# Patient Record
Sex: MALE | Race: OTHER | HISPANIC OR LATINO | URBAN - METROPOLITAN AREA
[De-identification: names, ages, dates, MRNs, and addresses within clinical notes are randomized per-mention and may not be internally consistent; named-entity substitution may affect disease eponyms.]

---

## 2014-07-03 RX ORDER — INSULIN GLARGINE 100 [IU]/ML
38 INJECTION, SOLUTION SUBCUTANEOUS
Qty: 0 | Refills: 0 | DISCHARGE
Start: 2014-07-03

## 2018-07-12 ENCOUNTER — EMERGENCY (EMERGENCY)
Facility: HOSPITAL | Age: 23
LOS: 1 days | Discharge: HOME | End: 2018-07-12
Attending: EMERGENCY MEDICINE | Admitting: EMERGENCY MEDICINE
Payer: COMMERCIAL

## 2018-07-12 VITALS
TEMPERATURE: 98 F | HEART RATE: 83 BPM | DIASTOLIC BLOOD PRESSURE: 90 MMHG | OXYGEN SATURATION: 95 % | SYSTOLIC BLOOD PRESSURE: 134 MMHG | RESPIRATION RATE: 18 BRPM | WEIGHT: 147.93 LBS | HEIGHT: 67 IN

## 2018-07-12 DIAGNOSIS — Z76.0 ENCOUNTER FOR ISSUE OF REPEAT PRESCRIPTION: ICD-10-CM

## 2018-07-12 DIAGNOSIS — T85.694A OTHER MECHANICAL COMPLICATION OF INSULIN PUMP, INITIAL ENCOUNTER: ICD-10-CM

## 2018-07-12 DIAGNOSIS — E10.9 TYPE 1 DIABETES MELLITUS WITHOUT COMPLICATIONS: ICD-10-CM

## 2018-07-12 PROCEDURE — 82962 GLUCOSE BLOOD TEST: CPT

## 2018-07-12 PROCEDURE — 99283 EMERGENCY DEPT VISIT LOW MDM: CPT | Mod: 25

## 2018-07-12 PROCEDURE — 96372 THER/PROPH/DIAG INJ SC/IM: CPT

## 2018-07-12 PROCEDURE — 99283 EMERGENCY DEPT VISIT LOW MDM: CPT

## 2018-07-12 RX ORDER — INSULIN GLARGINE 100 [IU]/ML
30 INJECTION, SOLUTION SUBCUTANEOUS ONCE
Refills: 0 | Status: COMPLETED | OUTPATIENT
Start: 2018-07-12 | End: 2018-07-12

## 2018-07-12 RX ADMIN — INSULIN GLARGINE 30 UNIT(S): 100 INJECTION, SOLUTION SUBCUTANEOUS at 16:22

## 2018-07-12 NOTE — ED PROVIDER NOTE - MEDICAL DECISION MAKING DETAILS
JACKLYN onrelas, comfortable w/out medical complaints, d/w Dr. Hahn at [phone], pt's endocrinologist, who is sending meds to his pharmacy, recommending 1x dose 30u Lantus SQ in the ED. Will administer and discharge pt.

## 2018-07-12 NOTE — ED PROVIDER NOTE - PHYSICAL EXAMINATION
VITAL SIGNS: I have reviewed nursing notes and confirm.  CONSTITUTIONAL: Well-developed; well-nourished; in no acute distress.  SKIN: Agree with RN documentation regarding decubitus evaluation. Remainder of skin exam is warm and dry, no acute rash.  HEAD: Normocephalic; atraumatic.  EYES: PERRL, EOM intact; conjunctiva and sclera clear.  ENT: No nasal discharge; airway clear.  NECK: Supple; non tender.  CARD: S1, S2 normal; no murmurs, gallops, or rubs. RRR  RESP: No wheezes, rales or rhonchi.  ABD: Normal bowel sounds; soft; non-distended; non-tender  EXT: Normal ROM. No clubbing, cyanosis or edema.  LYMPH: No acute cervical adenopathy.  NEURO: Alert, oriented. Grossly unremarkable.  PSYCH: Cooperative, appropriate.

## 2018-07-12 NOTE — ED ADULT TRIAGE NOTE - CHIEF COMPLAINT QUOTE
Pt requesting Rx refill.  Pt states "I've been having issues with my insurance and my doctor but I'm now on my last insulin pump and only have couple pens left."  FSBG WNL.

## 2018-07-12 NOTE — ED PROVIDER NOTE - OBJECTIVE STATEMENT
21 y/o M w/hx IDDM, has run out of omnipod insulin pump pods to administer his insulin and is having difficulty securing insurance authorization to receive pods. Presented to ED for assistance in obtaining his medication. No medical complaints at this time, measured blood glucose in mid 100s throughout the day today.

## 2020-01-19 ENCOUNTER — INPATIENT (INPATIENT)
Facility: HOSPITAL | Age: 25
LOS: 1 days | Discharge: HOME | DRG: 638 | End: 2020-01-21
Attending: FAMILY MEDICINE | Admitting: FAMILY MEDICINE
Payer: COMMERCIAL

## 2020-01-19 ENCOUNTER — TRANSCRIPTION ENCOUNTER (OUTPATIENT)
Age: 25
End: 2020-01-19

## 2020-01-19 VITALS
SYSTOLIC BLOOD PRESSURE: 140 MMHG | HEART RATE: 106 BPM | HEIGHT: 67 IN | OXYGEN SATURATION: 97 % | DIASTOLIC BLOOD PRESSURE: 96 MMHG | TEMPERATURE: 98 F | RESPIRATION RATE: 18 BRPM | WEIGHT: 167.99 LBS

## 2020-01-19 DIAGNOSIS — R73.9 HYPERGLYCEMIA, UNSPECIFIED: ICD-10-CM

## 2020-01-19 DIAGNOSIS — E11.65 TYPE 2 DIABETES MELLITUS WITH HYPERGLYCEMIA: ICD-10-CM

## 2020-01-19 DIAGNOSIS — Z29.9 ENCOUNTER FOR PROPHYLACTIC MEASURES, UNSPECIFIED: ICD-10-CM

## 2020-01-19 LAB
ALBUMIN SERPL ELPH-MCNC: 3.8 G/DL — SIGNIFICANT CHANGE UP (ref 3.5–5)
ALP SERPL-CCNC: 179 U/L — HIGH (ref 40–120)
ALT FLD-CCNC: 54 U/L DA — SIGNIFICANT CHANGE UP (ref 10–60)
ANION GAP SERPL CALC-SCNC: 10 MMOL/L — SIGNIFICANT CHANGE UP (ref 5–17)
ANION GAP SERPL CALC-SCNC: 2 MMOL/L — LOW (ref 5–17)
ANION GAP SERPL CALC-SCNC: 9 MMOL/L — SIGNIFICANT CHANGE UP (ref 5–17)
APPEARANCE UR: CLEAR — SIGNIFICANT CHANGE UP
AST SERPL-CCNC: 41 U/L — HIGH (ref 10–40)
BASE EXCESS BLDV CALC-SCNC: -1 MMOL/L — SIGNIFICANT CHANGE UP (ref -2–2)
BASOPHILS # BLD AUTO: 0.06 K/UL — SIGNIFICANT CHANGE UP (ref 0–0.2)
BASOPHILS NFR BLD AUTO: 0.7 % — SIGNIFICANT CHANGE UP (ref 0–2)
BILIRUB SERPL-MCNC: 0.8 MG/DL — SIGNIFICANT CHANGE UP (ref 0.2–1.2)
BILIRUB UR-MCNC: NEGATIVE — SIGNIFICANT CHANGE UP
BUN SERPL-MCNC: 16 MG/DL — SIGNIFICANT CHANGE UP (ref 7–18)
BUN SERPL-MCNC: 18 MG/DL — SIGNIFICANT CHANGE UP (ref 7–18)
BUN SERPL-MCNC: 9 MG/DL — SIGNIFICANT CHANGE UP (ref 7–18)
CALCIUM SERPL-MCNC: 7.9 MG/DL — LOW (ref 8.4–10.5)
CALCIUM SERPL-MCNC: 8.2 MG/DL — LOW (ref 8.4–10.5)
CALCIUM SERPL-MCNC: 9.6 MG/DL — SIGNIFICANT CHANGE UP (ref 8.4–10.5)
CHLORIDE SERPL-SCNC: 104 MMOL/L — SIGNIFICANT CHANGE UP (ref 96–108)
CHLORIDE SERPL-SCNC: 107 MMOL/L — SIGNIFICANT CHANGE UP (ref 96–108)
CHLORIDE SERPL-SCNC: 96 MMOL/L — SIGNIFICANT CHANGE UP (ref 96–108)
CO2 SERPL-SCNC: 19 MMOL/L — LOW (ref 22–31)
CO2 SERPL-SCNC: 25 MMOL/L — SIGNIFICANT CHANGE UP (ref 22–31)
CO2 SERPL-SCNC: 27 MMOL/L — SIGNIFICANT CHANGE UP (ref 22–31)
COLOR SPEC: YELLOW — SIGNIFICANT CHANGE UP
CREAT SERPL-MCNC: 0.92 MG/DL — SIGNIFICANT CHANGE UP (ref 0.5–1.3)
CREAT SERPL-MCNC: 0.95 MG/DL — SIGNIFICANT CHANGE UP (ref 0.5–1.3)
CREAT SERPL-MCNC: 1.05 MG/DL — SIGNIFICANT CHANGE UP (ref 0.5–1.3)
DIFF PNL FLD: NEGATIVE — SIGNIFICANT CHANGE UP
EOSINOPHIL # BLD AUTO: 0.03 K/UL — SIGNIFICANT CHANGE UP (ref 0–0.5)
EOSINOPHIL NFR BLD AUTO: 0.4 % — SIGNIFICANT CHANGE UP (ref 0–6)
GLUCOSE BLDC GLUCOMTR-MCNC: 135 MG/DL — HIGH (ref 70–99)
GLUCOSE BLDC GLUCOMTR-MCNC: 92 MG/DL — SIGNIFICANT CHANGE UP (ref 70–99)
GLUCOSE SERPL-MCNC: 103 MG/DL — HIGH (ref 70–99)
GLUCOSE SERPL-MCNC: 359 MG/DL — HIGH (ref 70–99)
GLUCOSE SERPL-MCNC: 493 MG/DL — CRITICAL HIGH (ref 70–99)
GLUCOSE UR QL: 1000 MG/DL
HCO3 BLDV-SCNC: 26 MMOL/L — SIGNIFICANT CHANGE UP (ref 21–29)
HCT VFR BLD CALC: 49.3 % — SIGNIFICANT CHANGE UP (ref 39–50)
HGB BLD-MCNC: 16.3 G/DL — SIGNIFICANT CHANGE UP (ref 13–17)
HOROWITZ INDEX BLDV+IHG-RTO: 21 — SIGNIFICANT CHANGE UP
IMM GRANULOCYTES NFR BLD AUTO: 0.4 % — SIGNIFICANT CHANGE UP (ref 0–1.5)
KETONES UR-MCNC: ABNORMAL
LEUKOCYTE ESTERASE UR-ACNC: NEGATIVE — SIGNIFICANT CHANGE UP
LYMPHOCYTES # BLD AUTO: 1.94 K/UL — SIGNIFICANT CHANGE UP (ref 1–3.3)
LYMPHOCYTES # BLD AUTO: 24.2 % — SIGNIFICANT CHANGE UP (ref 13–44)
MCHC RBC-ENTMCNC: 28 PG — SIGNIFICANT CHANGE UP (ref 27–34)
MCHC RBC-ENTMCNC: 33.1 GM/DL — SIGNIFICANT CHANGE UP (ref 32–36)
MCV RBC AUTO: 84.7 FL — SIGNIFICANT CHANGE UP (ref 80–100)
MONOCYTES # BLD AUTO: 0.43 K/UL — SIGNIFICANT CHANGE UP (ref 0–0.9)
MONOCYTES NFR BLD AUTO: 5.4 % — SIGNIFICANT CHANGE UP (ref 2–14)
NEUTROPHILS # BLD AUTO: 5.53 K/UL — SIGNIFICANT CHANGE UP (ref 1.8–7.4)
NEUTROPHILS NFR BLD AUTO: 68.9 % — SIGNIFICANT CHANGE UP (ref 43–77)
NITRITE UR-MCNC: NEGATIVE — SIGNIFICANT CHANGE UP
NRBC # BLD: 0 /100 WBCS — SIGNIFICANT CHANGE UP (ref 0–0)
PCO2 BLDV: 51 MMHG — HIGH (ref 35–50)
PCP SPEC-MCNC: SIGNIFICANT CHANGE UP
PH BLDV: 7.32 — LOW (ref 7.35–7.45)
PH UR: 5 — SIGNIFICANT CHANGE UP (ref 5–8)
PLATELET # BLD AUTO: 229 K/UL — SIGNIFICANT CHANGE UP (ref 150–400)
PO2 BLDV: 44 MMHG — SIGNIFICANT CHANGE UP (ref 25–45)
POTASSIUM SERPL-MCNC: 4.1 MMOL/L — SIGNIFICANT CHANGE UP (ref 3.5–5.3)
POTASSIUM SERPL-MCNC: 4.4 MMOL/L — SIGNIFICANT CHANGE UP (ref 3.5–5.3)
POTASSIUM SERPL-MCNC: 4.9 MMOL/L — SIGNIFICANT CHANGE UP (ref 3.5–5.3)
POTASSIUM SERPL-SCNC: 4.1 MMOL/L — SIGNIFICANT CHANGE UP (ref 3.5–5.3)
POTASSIUM SERPL-SCNC: 4.4 MMOL/L — SIGNIFICANT CHANGE UP (ref 3.5–5.3)
POTASSIUM SERPL-SCNC: 4.9 MMOL/L — SIGNIFICANT CHANGE UP (ref 3.5–5.3)
PROT SERPL-MCNC: 8.2 G/DL — SIGNIFICANT CHANGE UP (ref 6–8.3)
PROT UR-MCNC: NEGATIVE — SIGNIFICANT CHANGE UP
RBC # BLD: 5.82 M/UL — HIGH (ref 4.2–5.8)
RBC # FLD: 12.9 % — SIGNIFICANT CHANGE UP (ref 10.3–14.5)
SAO2 % BLDV: 72 % — SIGNIFICANT CHANGE UP (ref 67–88)
SODIUM SERPL-SCNC: 130 MMOL/L — LOW (ref 135–145)
SODIUM SERPL-SCNC: 133 MMOL/L — LOW (ref 135–145)
SODIUM SERPL-SCNC: 136 MMOL/L — SIGNIFICANT CHANGE UP (ref 135–145)
SP GR SPEC: 1.01 — SIGNIFICANT CHANGE UP (ref 1.01–1.02)
UROBILINOGEN FLD QL: NEGATIVE — SIGNIFICANT CHANGE UP
WBC # BLD: 8.02 K/UL — SIGNIFICANT CHANGE UP (ref 3.8–10.5)
WBC # FLD AUTO: 8.02 K/UL — SIGNIFICANT CHANGE UP (ref 3.8–10.5)

## 2020-01-19 PROCEDURE — 71045 X-RAY EXAM CHEST 1 VIEW: CPT | Mod: 26

## 2020-01-19 PROCEDURE — 99291 CRITICAL CARE FIRST HOUR: CPT

## 2020-01-19 RX ORDER — INSULIN LISPRO 100/ML
6 VIAL (ML) SUBCUTANEOUS
Refills: 0 | Status: DISCONTINUED | OUTPATIENT
Start: 2020-01-19 | End: 2020-01-19

## 2020-01-19 RX ORDER — LANOLIN ALCOHOL/MO/W.PET/CERES
3 CREAM (GRAM) TOPICAL AT BEDTIME
Refills: 0 | Status: COMPLETED | OUTPATIENT
Start: 2020-01-19 | End: 2020-01-19

## 2020-01-19 RX ORDER — INSULIN HUMAN 100 [IU]/ML
10 INJECTION, SOLUTION SUBCUTANEOUS ONCE
Refills: 0 | Status: COMPLETED | OUTPATIENT
Start: 2020-01-19 | End: 2020-01-19

## 2020-01-19 RX ORDER — SODIUM CHLORIDE 9 MG/ML
1000 INJECTION INTRAMUSCULAR; INTRAVENOUS; SUBCUTANEOUS ONCE
Refills: 0 | Status: COMPLETED | OUTPATIENT
Start: 2020-01-19 | End: 2020-01-19

## 2020-01-19 RX ORDER — INSULIN LISPRO 100/ML
VIAL (ML) SUBCUTANEOUS
Refills: 0 | Status: DISCONTINUED | OUTPATIENT
Start: 2020-01-19 | End: 2020-01-19

## 2020-01-19 RX ORDER — INSULIN HUMAN 100 [IU]/ML
7.6 INJECTION, SOLUTION SUBCUTANEOUS
Qty: 100 | Refills: 0 | Status: DISCONTINUED | OUTPATIENT
Start: 2020-01-19 | End: 2020-01-19

## 2020-01-19 RX ORDER — INSULIN LISPRO 100/ML
VIAL (ML) SUBCUTANEOUS
Refills: 0 | Status: DISCONTINUED | OUTPATIENT
Start: 2020-01-19 | End: 2020-01-21

## 2020-01-19 RX ORDER — SODIUM CHLORIDE 9 MG/ML
1000 INJECTION INTRAMUSCULAR; INTRAVENOUS; SUBCUTANEOUS
Refills: 0 | Status: DISCONTINUED | OUTPATIENT
Start: 2020-01-19 | End: 2020-01-19

## 2020-01-19 RX ORDER — INSULIN GLARGINE 100 [IU]/ML
25 INJECTION, SOLUTION SUBCUTANEOUS AT BEDTIME
Refills: 0 | Status: DISCONTINUED | OUTPATIENT
Start: 2020-01-19 | End: 2020-01-19

## 2020-01-19 RX ORDER — INSULIN GLARGINE 100 [IU]/ML
30 INJECTION, SOLUTION SUBCUTANEOUS AT BEDTIME
Refills: 0 | Status: DISCONTINUED | OUTPATIENT
Start: 2020-01-19 | End: 2020-01-19

## 2020-01-19 RX ORDER — DEXTROSE 50 % IN WATER 50 %
25 SYRINGE (ML) INTRAVENOUS ONCE
Refills: 0 | Status: DISCONTINUED | OUTPATIENT
Start: 2020-01-19 | End: 2020-01-21

## 2020-01-19 RX ORDER — INSULIN LISPRO 100/ML
6 VIAL (ML) SUBCUTANEOUS
Refills: 0 | Status: DISCONTINUED | OUTPATIENT
Start: 2020-01-19 | End: 2020-01-21

## 2020-01-19 RX ORDER — INSULIN GLARGINE 100 [IU]/ML
15 INJECTION, SOLUTION SUBCUTANEOUS AT BEDTIME
Refills: 0 | Status: DISCONTINUED | OUTPATIENT
Start: 2020-01-19 | End: 2020-01-21

## 2020-01-19 RX ADMIN — SODIUM CHLORIDE 75 MILLILITER(S): 9 INJECTION INTRAMUSCULAR; INTRAVENOUS; SUBCUTANEOUS at 18:37

## 2020-01-19 RX ADMIN — INSULIN HUMAN 10 UNIT(S): 100 INJECTION, SOLUTION SUBCUTANEOUS at 15:49

## 2020-01-19 RX ADMIN — Medication 2: at 18:36

## 2020-01-19 RX ADMIN — SODIUM CHLORIDE 1000 MILLILITER(S): 9 INJECTION INTRAMUSCULAR; INTRAVENOUS; SUBCUTANEOUS at 14:00

## 2020-01-19 RX ADMIN — SODIUM CHLORIDE 1000 MILLILITER(S): 9 INJECTION INTRAMUSCULAR; INTRAVENOUS; SUBCUTANEOUS at 15:31

## 2020-01-19 RX ADMIN — Medication 6 UNIT(S): at 18:58

## 2020-01-19 NOTE — H&P ADULT - ASSESSMENT
Pt is a 24 yr old male with DM type 1 on Insulin at home comes in with vomiting, chest discomfort and leg pain. Pt was initially on insulin pump about 2 months ago but due to insurance problem and was transitioned to Novolog and basaglar Pt states that since then his bs has been very uncontrolled and he doesn't feel well. Pt states that he takes 38 units of basaglar and 25 TID of Novolog. Pt states compliance with medications. Pt denies any headache, weakness, palpitations, fever, chills, cough, shortness of breath, abd pain or change in urinary or bowel habits. In ED pt vitals were    T(C): 36.8  HR: 101   BP: 131/75   RR: 17   SpO2: 100%  Pt noted with BSl in 400's with mild acidosis and ketones but no anion gap. Pt is being admitted for monitoring, adjustment of insulin regimen and endo evaluation

## 2020-01-19 NOTE — ED PROVIDER NOTE - CLINICAL SUMMARY MEDICAL DECISION MAKING FREE TEXT BOX
Will admit for DKA. anion gap closed (despite ph 7.32 and ketones) will discuss with icu since gap closed perhaps no need to start insulin gtt qat this time

## 2020-01-19 NOTE — ED ADULT TRIAGE NOTE - CHIEF COMPLAINT QUOTE
States " my sugar has been elevated since being off insulin pump and on insulin pen since about 45 days"

## 2020-01-19 NOTE — ED PROVIDER NOTE - CONSTITUTIONAL, MLM
normal... Very well appearing, awake, alert, oriented to person, place, time/situation and in no apparent distress.

## 2020-01-19 NOTE — CONSULT NOTE ADULT - SUBJECTIVE AND OBJECTIVE BOX
Patient is a 24y old  Male who presents with a chief complaint of hyperglycemia (2020 18:12)      Initial HPI on admission:  HPI:  25 y/o M pt with a PMHx of Type 1 DM, presents to the ED with complaints of vomiting x2 days, chest tightness and shin splints. Patient reports the symptoms feel like the last time he had DKA. Notes 1.5 months ago he switched from the pump to pen because of insurance and now has high sugars. NKDA. (2020 18:05)      BRIEF HOSPITAL COURSE: ***    PAST MEDICAL & SURGICAL HISTORY:  Diabetes    Allergies    No Known Allergies    Intolerances      FAMILY HISTORY:    Social history reviewed: ***    Review of Systems:  CONSTITUTIONAL: No fever, chills, or fatigue  EYES: No eye pain, visual disturbances, or discharge  ENMT:  No difficulty hearing, tinnitus, vertigo; No sinus or throat pain  NECK: No pain or stiffness  RESPIRATORY: No cough, wheezing, chills or hemoptysis; No shortness of breath  CARDIOVASCULAR: No chest pain, palpitations, dizziness, or leg swelling  GASTROINTESTINAL: No abdominal or epigastric pain. No nausea, vomiting, or hematemesis; No diarrhea or constipation. No melena or hematochezia.  GENITOURINARY: No dysuria, frequency, hematuria, or incontinence  NEUROLOGICAL: No headaches, memory loss, loss of strength, numbness, or tremors  SKIN: No itching, burning, rashes, or lesions   MUSCULOSKELETAL: No joint pain or swelling; No muscle, back, or extremity pain  PSYCHIATRIC: No depression, anxiety, mood swings, or difficulty sleeping      Medications:  dextrose 50% Injectable 25 Gram(s) IV Push once  insulin glargine Injectable (LANTUS) 30 Unit(s) SubCutaneous at bedtime  insulin lispro (HumaLOG) corrective regimen sliding scale   SubCutaneous three times a day before meals  sodium chloride 0.9%. 1000 milliLiter(s) IV Continuous <Continuous>      vent settings      Vital Signs Last 24 Hrs  T(C): 36.7 (2020 15:45), Max: 36.7 (2020 13:31)  T(F): 98 (2020 15:45), Max: 98.1 (2020 13:31)  HR: 107 (2020 15:45) (106 - 107)  BP: 126/75 (2020 15:45) (126/75 - 140/96)  BP(mean): --  RR: 18 (2020 15:45) (18 - 18)  SpO2: 97% (2020 15:45) (97% - 97%)              LABS:                        16.3   8.02  )-----------( 229      ( 2020 13:56 )             49.3     -19    133<L>  |  104  |  16  ----------------------------<  359<H>  4.1   |  19<L>  |  0.92    Ca    7.9<L>      2020 16:35    TPro  8.2  /  Alb  3.8  /  TBili  0.8  /  DBili  x   /  AST  41<H>  /  ALT  54  /  AlkPhos  179<H>        CARDIAC MARKERS ( 2020 16:35 )  <0.015 ng/mL / x     / 124 U/L / x     / 1.4 ng/mL      CAPILLARY BLOOD GLUCOSE      POCT Blood Glucose.: 244 mg/dL (2020 17:10)      Urinalysis Basic - ( 2020 13:56 )    Color: Yellow / Appearance: Clear / S.010 / pH: x  Gluc: x / Ketone: Moderate  / Bili: Negative / Urobili: Negative   Blood: x / Protein: Negative / Nitrite: Negative   Leuk Esterase: Negative / RBC: x / WBC x   Sq Epi: x / Non Sq Epi: x / Bacteria: x      CULTURES:        Physical Examination:    General: No acute distress.      HEENT: Pupils equal, reactive to light.  Symmetric.    PULM: Clear to auscultation bilaterally, no significant sputum production    CVS: Regular rate and rhythm, no murmurs, rubs, or gallops    ABD: Soft, nondistended, nontender, normoactive bowel sounds, no masses    EXT: No edema, nontender    SKIN: Warm and well perfused, no rashes noted.    NEURO: Alert, oriented, interactive, nonfocal    RADIOLOGY REVIEWED ***          IMPRESSION:PAST MEDICAL & SURGICAL HISTORY:  Diabetes   p/w       IMP: This is a 24 yr old man  M pt with  Type 1 DM, presents to the ED with complaints of vomiting x2 days, chest tightness and shin splints. ICU is consulted for hyperglycemia/ HONK.  Blood sugar improved with ivf and insulin      #Hyperglycemia  hx of T1DM since age 1  on insulin at home, compliance with insulin, last A1C 2 month ago was 8.2  , s/p regular insulin 10U, FS improved to 244  AG 9, Bicarb 25, pH 7.32, mental status at baseline, not in DKA vs HONK   s/p 2L NS bolus in ED   Repeat BMP noted AG still close, FS better controlled  recommend to increase insulin basal-bolus regimen, FS ac/hs, IV hydration   pt is stable to be downgraded to medicine floor, f/u Endo consult.               Plan:    CNS: no issue    PULMONARY: no issue    CARDIAC: no isue    GI: Diabetic diet    RENAL: monitor out put    ENDO: insulin with ivf ,  endo f/u    SKIN: no issue    MUSCULOSKELETAL: no issue    ID: no issue    HEME: monitor    DVT and GI Prophylaxis          Plan of care discussed with family and ICU team/ ER attend Dr Herrmann    CRITICAL CARE TIME SPENT:  39 minutes

## 2020-01-19 NOTE — CONSULT NOTE ADULT - SUBJECTIVE AND OBJECTIVE BOX
Patient is a 24y old  Male who presents with a chief complaint of     Initial HPI on admission:  HPI:  23 y/o M pt with a PMHx of Type 1 DM, presents to the ED with complaints of vomiting x2 days, chest tightness and shin splints. Patient reports the symptoms feel like the last time he had DKA. Notes 1.5 months ago he switched from the pump to pen because of insurance and now has high sugars. NKDA. (2020 18:05)      BRIEF HOSPITAL COURSE: ***    PAST MEDICAL & SURGICAL HISTORY:  Diabetes    Allergies    No Known Allergies    Intolerances      FAMILY HISTORY:    Social history reviewed: ***    Review of Systems:  CONSTITUTIONAL: No fever, chills, or fatigue  EYES: No eye pain, visual disturbances, or discharge  ENMT:  No difficulty hearing, tinnitus, vertigo; No sinus or throat pain  NECK: No pain or stiffness  RESPIRATORY: No cough, wheezing, chills or hemoptysis; No shortness of breath  CARDIOVASCULAR: No chest pain, palpitations, dizziness, or leg swelling  GASTROINTESTINAL: No abdominal or epigastric pain. No nausea, vomiting, or hematemesis; No diarrhea or constipation. No melena or hematochezia.  GENITOURINARY: No dysuria, frequency, hematuria, or incontinence  NEUROLOGICAL: No headaches, memory loss, loss of strength, numbness, or tremors  SKIN: No itching, burning, rashes, or lesions   MUSCULOSKELETAL: No joint pain or swelling; No muscle, back, or extremity pain  PSYCHIATRIC: No depression, anxiety, mood swings, or difficulty sleeping      Medications:  dextrose 50% Injectable 25 Gram(s) IV Push once  insulin lispro (HumaLOG) corrective regimen sliding scale   SubCutaneous three times a day before meals  sodium chloride 0.9%. 1000 milliLiter(s) IV Continuous <Continuous>      vent settings      Vital Signs Last 24 Hrs  T(C): 36.7 (2020 15:45), Max: 36.7 (2020 13:31)  T(F): 98 (2020 15:45), Max: 98.1 (2020 13:31)  HR: 107 (2020 15:45) (106 - 107)  BP: 126/75 (2020 15:45) (126/75 - 140/96)  BP(mean): --  RR: 18 (2020 15:45) (18 - 18)  SpO2: 97% (2020 15:45) (97% - 97%)              LABS:                        16.3   8.02  )-----------( 229      ( 2020 13:56 )             49.3     -19    133<L>  |  104  |  16  ----------------------------<  359<H>  4.1   |  19<L>  |  0.92    Ca    7.9<L>      2020 16:35    TPro  8.2  /  Alb  3.8  /  TBili  0.8  /  DBili  x   /  AST  41<H>  /  ALT  54  /  AlkPhos  179<H>        CARDIAC MARKERS ( 2020 16:35 )  <0.015 ng/mL / x     / 124 U/L / x     / 1.4 ng/mL      CAPILLARY BLOOD GLUCOSE      POCT Blood Glucose.: 244 mg/dL (2020 17:10)      Urinalysis Basic - ( 2020 13:56 )    Color: Yellow / Appearance: Clear / S.010 / pH: x  Gluc: x / Ketone: Moderate  / Bili: Negative / Urobili: Negative   Blood: x / Protein: Negative / Nitrite: Negative   Leuk Esterase: Negative / RBC: x / WBC x   Sq Epi: x / Non Sq Epi: x / Bacteria: x      CULTURES:        Physical Examination:    General: No acute distress.      HEENT: Pupils equal, reactive to light.  Symmetric.    PULM: Clear to auscultation bilaterally, no significant sputum production    CVS: Regular rate and rhythm, no murmurs, rubs, or gallops    ABD: Soft, nondistended, nontender, normoactive bowel sounds, no masses    EXT: No edema, nontender    SKIN: Warm and well perfused, no rashes noted.    NEURO: Alert, oriented, interactive, nonfocal    RADIOLOGY REVIEWED ***    CRITICAL CARE TIME SPENT: 35 minutes Patient is a 24y old  Male who presents with a chief complaint of     Initial HPI on admission:  HPI:  23 y/o M pt with a PMHx of Type 1 DM, presents to the ED with complaints of vomiting x2 days, chest tightness and shin splints. Patient reports the symptoms feel like the last time he had DKA. Notes 1.5 months ago he switched from the pump to pen because of insurance and now has high sugars. NKDA. (2020 18:05)      BRIEF HOSPITAL COURSE: ICU is consulted for hyperglycemia. ED course, vitals wnl, , s/p regular insulin 10U, FS improved to 244. AG 9, Bicarb 25, pH 7.32, s/p 2L NS bolus. Pt still felt nausea, no vomiting. Currently also c/o left chest tightness, worse with deep breath. EKG noted NSR, no st-t changes, troponin negative. Repeat BMP noted AG still close, FS better controlled, pt is stable to be downgraded to medicine floor, f/u Endo consult.     PAST MEDICAL & SURGICAL HISTORY:  Diabetes    Allergies    No Known Allergies    Intolerances      FAMILY HISTORY:    Social history reviewed: ***    Review of Systems:  CONSTITUTIONAL: No fever, chills, or fatigue  EYES: No eye pain, visual disturbances, or discharge  ENMT:  No difficulty hearing, tinnitus, vertigo; No sinus or throat pain  NECK: No pain or stiffness  RESPIRATORY: No cough, wheezing, chills or hemoptysis; No shortness of breath  CARDIOVASCULAR: No chest pain, palpitations, dizziness, or leg swelling  GASTROINTESTINAL: No abdominal or epigastric pain. No nausea, vomiting, or hematemesis; No diarrhea or constipation. No melena or hematochezia.  GENITOURINARY: No dysuria, frequency, hematuria, or incontinence  NEUROLOGICAL: No headaches, memory loss, loss of strength, numbness, or tremors  SKIN: No itching, burning, rashes, or lesions   MUSCULOSKELETAL: No joint pain or swelling; No muscle, back, or extremity pain  PSYCHIATRIC: No depression, anxiety, mood swings, or difficulty sleeping      Medications:  dextrose 50% Injectable 25 Gram(s) IV Push once  insulin lispro (HumaLOG) corrective regimen sliding scale   SubCutaneous three times a day before meals  sodium chloride 0.9%. 1000 milliLiter(s) IV Continuous <Continuous>      vent settings      Vital Signs Last 24 Hrs  T(C): 36.7 (2020 15:45), Max: 36.7 (2020 13:31)  T(F): 98 (2020 15:45), Max: 98.1 (2020 13:31)  HR: 107 (2020 15:45) (106 - 107)  BP: 126/75 (2020 15:45) (126/75 - 140/96)  BP(mean): --  RR: 18 (2020 15:45) (18 - 18)  SpO2: 97% (2020 15:45) (97% - 97%)              LABS:                        16.3   8.02  )-----------( 229      ( 2020 13:56 )             49.3     01-19    133<L>  |  104  |  16  ----------------------------<  359<H>  4.1   |  19<L>  |  0.92    Ca    7.9<L>      2020 16:35    TPro  8.2  /  Alb  3.8  /  TBili  0.8  /  DBili  x   /  AST  41<H>  /  ALT  54  /  AlkPhos  179<H>  01-19      CARDIAC MARKERS ( 2020 16:35 )  <0.015 ng/mL / x     / 124 U/L / x     / 1.4 ng/mL      CAPILLARY BLOOD GLUCOSE      POCT Blood Glucose.: 244 mg/dL (2020 17:10)      Urinalysis Basic - ( 2020 13:56 )    Color: Yellow / Appearance: Clear / S.010 / pH: x  Gluc: x / Ketone: Moderate  / Bili: Negative / Urobili: Negative   Blood: x / Protein: Negative / Nitrite: Negative   Leuk Esterase: Negative / RBC: x / WBC x   Sq Epi: x / Non Sq Epi: x / Bacteria: x      CULTURES:        Physical Examination:    General: No acute distress.      HEENT: Pupils equal, reactive to light.  Symmetric.    PULM: Clear to auscultation bilaterally, no significant sputum production    CVS: Regular rate and rhythm, no murmurs, rubs, or gallops    ABD: Soft, nondistended, nontender, normoactive bowel sounds, no masses    EXT: No edema, nontender    SKIN: Warm and well perfused, no rashes noted.    NEURO: Alert, oriented, interactive, nonfocal    RADIOLOGY REVIEWED ***    CRITICAL CARE TIME SPENT: 35 minutes Patient is a 24y old  Male who presents with a chief complaint of     Initial HPI on admission:  HPI:  25 y/o M pt with a PMHx of Type 1 DM, presents to the ED with complaints of vomiting x2 days, chest tightness and shin splints. Patient reports the symptoms feel like the last time he had DKA. Notes 1.5 months ago he switched from the pump to pen because of insurance and now has high sugars. NKDA. (2020 18:05)      BRIEF HOSPITAL COURSE: ICU is consulted for hyperglycemia. ED course, vitals wnl, , s/p regular insulin 10U, FS improved to 244. AG 9, Bicarb 25, pH 7.32, s/p 2L NS bolus. Pt still felt nausea, no vomiting. Currently also c/o left chest tightness, worse with deep breath. EKG noted NSR, no st-t changes, troponin negative. Repeat BMP noted AG still close, FS better controlled, pt is stable to be downgraded to medicine floor, f/u Endo consult.     PAST MEDICAL & SURGICAL HISTORY:  Diabetes    Allergies    No Known Allergies    Intolerances      FAMILY HISTORY:    Social history reviewed    Review of Systems:  CONSTITUTIONAL: No fever, chills, or fatigue  EYES: No eye pain, visual disturbances, or discharge  ENMT:  No difficulty hearing, tinnitus, vertigo; No sinus or throat pain  NECK: No pain or stiffness  RESPIRATORY: No cough, wheezing, chills or hemoptysis; No shortness of breath  CARDIOVASCULAR: Rt side chest pain, no palpitations, dizziness, or leg swelling  GASTROINTESTINAL: nausea, vomiting. No diarrhea or constipation. No melena or hematochezia.  NEUROLOGICAL: No headaches, memory loss, loss of strength, numbness, or tremors  SKIN: No itching, burning, rashes, or lesions       Medications:  dextrose 50% Injectable 25 Gram(s) IV Push once  insulin lispro (HumaLOG) corrective regimen sliding scale   SubCutaneous three times a day before meals  sodium chloride 0.9%. 1000 milliLiter(s) IV Continuous <Continuous>      vent settings      Vital Signs Last 24 Hrs  T(C): 36.7 (2020 15:45), Max: 36.7 (2020 13:31)  T(F): 98 (2020 15:45), Max: 98.1 (2020 13:31)  HR: 107 (2020 15:45) (106 - 107)  BP: 126/75 (2020 15:45) (126/75 - 140/96)  BP(mean): --  RR: 18 (2020 15:45) (18 - 18)  SpO2: 97% (2020 15:45) (97% - 97%)              LABS:                        16.3   8.02  )-----------( 229      ( 2020 13:56 )             49.3     -19    133<L>  |  104  |  16  ----------------------------<  359<H>  4.1   |  19<L>  |  0.92    Ca    7.9<L>      2020 16:35    TPro  8.2  /  Alb  3.8  /  TBili  0.8  /  DBili  x   /  AST  41<H>  /  ALT  54  /  AlkPhos  179<H>        CARDIAC MARKERS ( 2020 16:35 )  <0.015 ng/mL / x     / 124 U/L / x     / 1.4 ng/mL      CAPILLARY BLOOD GLUCOSE      POCT Blood Glucose.: 244 mg/dL (2020 17:10)      Urinalysis Basic - ( 2020 13:56 )    Color: Yellow / Appearance: Clear / S.010 / pH: x  Gluc: x / Ketone: Moderate  / Bili: Negative / Urobili: Negative   Blood: x / Protein: Negative / Nitrite: Negative   Leuk Esterase: Negative / RBC: x / WBC x   Sq Epi: x / Non Sq Epi: x / Bacteria: x      CULTURES:        Physical Examination:    General: No acute distress.      HEENT: Pupils equal, reactive to light.  Symmetric.    PULM: Clear to auscultation bilaterally, no significant sputum production    CVS: Regular rate and rhythm, no murmurs, rubs, or gallops    ABD: Soft, nondistended, nontender, normoactive bowel sounds, no masses    EXT: No edema, nontender    SKIN: Warm and well perfused, no rashes noted.    NEURO: Alert, oriented, interactive, nonfocal    RADIOLOGY REVIEWED    CRITICAL CARE TIME SPENT: 35 minutes

## 2020-01-19 NOTE — H&P ADULT - HISTORY OF PRESENT ILLNESS
25 y/o M pt with a PMHx of Type 1 DM, presents to the ED with complaints of vomiting x2 days, chest tightness and shin splints. Patient reports the symptoms feel like the last time he had DKA. Notes 1.5 months ago he switched from the pump to pen because of insurance and now has high sugars. NKDA. Pt is a 24 yr old male with DM type 1 on Insulin at home comes in with vomiting, chest discomfort and leg pain. Pt was initially on insulin pump about 2 months ago but due to insurance problem and was transitioned to Novolog and basaglar Pt states that since then his bs has been very uncontrolled and he doesn't feel well. Pt states that he takes 38 units of basaglar and 25 TID of Novolog. Pt states compliance with medications. Pt denies any headache, weakness, palpitations, fever, chills, cough, shortness of breath, abd pain or change in urinary or bowel habits.

## 2020-01-19 NOTE — ED ADULT NURSE NOTE - ED CARDIAC HEART SOUNDS
PT requesting a refill of hydroxyzine, no protocol. Unable to approve.     LOV 11/9/18    LF 9/27/18 #30    Future Appointments   Date Time Provider Miya Tirado   2/8/2019 10:40 AM Vivienne Becerra, DO [facility] [place]
normal S1, S2 heard

## 2020-01-19 NOTE — CONSULT NOTE ADULT - ASSESSMENT
25 y/o M pt with a PMHx of Type 1 DM, presents to the ED with complaints of vomiting x2 days, chest tightness and shin splints. ICU is consulted for hyperglycemia. ED course, vitals wnl, , s/p regular insulin 10U, FS improved to 244. AG 9, Bicarb 25, pH 7.32, s/p 2L NS bolus. Pt still felt nausea, no vomiting. Currently also c/o left chest tightness, worse with deep breath. EKG noted NSR, no st-t changes, troponin negative. Repeat BMP noted AG still close, FS better controlled, pt is stable to be downgraded to medicine floor, f/u Endo consult.       Hyperglycemia  -T1DM since age [age] y/o M pt with a PMHx of Type 1 DM, presents to the ED with complaints of vomiting x2 days, chest tightness and shin splints. ICU is consulted for hyperglycemia.       #Hyperglycemia  hx of T1DM since age 1  on insulin at home, compliance with insulin, last A1C 2 month ago was 8.2  , s/p regular insulin 10U, FS improved to 244  AG 9, Bicarb 25, pH 7.32, mental status at baseline, not in DKA vs HONK   s/p 2L NS bolus in ED   Repeat BMP noted AG still close, FS better controlled  recommend to increase insulin basal-bolus regimen, FS ac/hs, IV hydration   pt is stable to be downgraded to medicine floor, f/u Endo consult.

## 2020-01-19 NOTE — ED PROVIDER NOTE - OBJECTIVE STATEMENT
3 y/o M pt with a PMHx of Type 1 DM, presents to the ED with complaints of vomiting x2 days, chest tightness and shin splints. Patient reports the symptoms feel like the last time he had DKA. Notes 1.5 months ago he switched from the pump to pen because of insurance and now has high sugars. NKDA.

## 2020-01-19 NOTE — ED PROVIDER NOTE - PROGRESS NOTE DETAILS
icu consulted, agree that since anion gap closed no need to start insulin gtt icu recommends repeating bmp after ivfs and insulin, if still closed admit to medicine

## 2020-01-19 NOTE — H&P ADULT - PROBLEM SELECTOR PLAN 1
Pt coming in with uncontrolled BSL  's with normal bicarb and anion gap  s/p 2L IVF and Insulin regular 10 units in ED  Pt appears to have no signs of infection with no fever or white count, CXr negative for infiltrate, UA neagtive  Will start Pt on standing fluids  Benson start on humalog 6 tid with moderate sliding scale and Lantus 25 units at bedtime  f/u BSL  f/u bmp at night and am for bicarb and anion gap  Endo Dr Rosado consulted

## 2020-01-19 NOTE — H&P ADULT - ATTENDING COMMENTS
Patient seen and examined. Case fully discussed with  ER attending and medical resident. Reviewed chief complaint. Reviewed review of systems. Reviewed list of medications.  Reviewed physical exam.  Reviewed assessment and plan. agree with full H and P. Will follow up clinically. Will follow up with Endocrinology.

## 2020-01-20 DIAGNOSIS — R73.9 HYPERGLYCEMIA, UNSPECIFIED: ICD-10-CM

## 2020-01-20 LAB
GLUCOSE BLDC GLUCOMTR-MCNC: 199 MG/DL — HIGH (ref 70–99)
GLUCOSE BLDC GLUCOMTR-MCNC: 208 MG/DL — HIGH (ref 70–99)
GLUCOSE BLDC GLUCOMTR-MCNC: 231 MG/DL — HIGH (ref 70–99)
GLUCOSE BLDC GLUCOMTR-MCNC: 237 MG/DL — HIGH (ref 70–99)
GLUCOSE BLDC GLUCOMTR-MCNC: 328 MG/DL — HIGH (ref 70–99)
GLUCOSE BLDC GLUCOMTR-MCNC: 358 MG/DL — HIGH (ref 70–99)

## 2020-01-20 RX ORDER — INSULIN GLARGINE 100 [IU]/ML
20 INJECTION, SOLUTION SUBCUTANEOUS ONCE
Refills: 0 | Status: COMPLETED | OUTPATIENT
Start: 2020-01-20 | End: 2020-01-20

## 2020-01-20 RX ORDER — INSULIN GLARGINE 100 [IU]/ML
20 INJECTION, SOLUTION SUBCUTANEOUS EVERY MORNING
Refills: 0 | Status: DISCONTINUED | OUTPATIENT
Start: 2020-01-20 | End: 2020-01-21

## 2020-01-20 RX ADMIN — Medication 4: at 08:14

## 2020-01-20 RX ADMIN — Medication 3 MILLIGRAM(S): at 00:20

## 2020-01-20 RX ADMIN — INSULIN GLARGINE 20 UNIT(S): 100 INJECTION, SOLUTION SUBCUTANEOUS at 11:24

## 2020-01-20 RX ADMIN — INSULIN GLARGINE 15 UNIT(S): 100 INJECTION, SOLUTION SUBCUTANEOUS at 00:13

## 2020-01-20 RX ADMIN — Medication 2: at 17:22

## 2020-01-20 RX ADMIN — INSULIN GLARGINE 15 UNIT(S): 100 INJECTION, SOLUTION SUBCUTANEOUS at 22:48

## 2020-01-20 RX ADMIN — Medication 6 UNIT(S): at 08:14

## 2020-01-20 RX ADMIN — Medication 6 UNIT(S): at 17:21

## 2020-01-20 RX ADMIN — Medication 2: at 12:15

## 2020-01-20 RX ADMIN — Medication 6 UNIT(S): at 12:15

## 2020-01-20 NOTE — DISCHARGE NOTE PROVIDER - PROVIDER TOKENS
FREE:[LAST:[chau],FIRST:[jaswant],PHONE:[phone]],FAX:[(   )    -],ADDRESS:[[phone] [place] [address]FOLLOWUP:[1-3 days]]

## 2020-01-20 NOTE — CONSULT NOTE ADULT - PROBLEM SELECTOR RECOMMENDATION 9
Igorne un cont typ1 dm  add lantus 20 units qam  cont 15 qhs  cont humalog 6 ac tid  interested in omnipod pump  f/u with pvt endo  fsg ac and hs

## 2020-01-20 NOTE — DISCHARGE NOTE PROVIDER - NSDCCPCAREPLAN_GEN_ALL_CORE_FT
PRINCIPAL DISCHARGE DIAGNOSIS  Diagnosis: Hyperglycemia  Assessment and Plan of Treatment: You were admitted with very high blood glucose associated with GI symptoms.  You reported transitioning to Basaglar and Novolog Insulins due to inability to continue Insulin pump due to insurance problems.  You were followed by endocrinologist Dr. Rosado who titrated your Insulin per your requirements.  -You are being discharged on 35 units Basaglar bedtime and 8-10units Humalog 3x/day pre meals.  -Please follow up with your endocrinologist.  -Please continue monitoring your blood glucose 3x/day and bed time.      SECONDARY DISCHARGE DIAGNOSES  Diagnosis: Uncontrolled type 1 diabetes mellitus  Assessment and Plan of Treatment: You used to manage your diabetes with Insulin pump.  You transitioned to Insulin via pen for the past two months during which time your diabetes was not welll controlled.  Please follow up with your endocrinologist to plan for further management.  You are discharged with Insulin per Dr. Rosado recommendations

## 2020-01-20 NOTE — CONSULT NOTE ADULT - ASSESSMENT
Pt is a 24 yr old male with DM type 1 on Insulin at home comes in with vomiting, chest discomfort and leg pain. Pt was initially on insulin pump about 2 months ago but due to insurance problem and was transitioned to Novolog and basaglar. Found to have un cont dm.

## 2020-01-20 NOTE — PROGRESS NOTE ADULT - SUBJECTIVE AND OBJECTIVE BOX
:::::ACUTE CARE FOLLOW UP:::::    INTERVAL HPI/OVERNIGHT EVENTS:       Antimicrobial:    Cardiovascular:    Pulmonary:    Hematalogic:    Other:  dextrose 50% Injectable 25 Gram(s) IV Push once  insulin glargine Injectable (LANTUS) 15 Unit(s) SubCutaneous at bedtime  insulin lispro (HumaLOG) corrective regimen sliding scale   SubCutaneous three times a day before meals  insulin lispro Injectable (HumaLOG) 6 Unit(s) SubCutaneous three times a day before meals      Drug Dosing Weight  Height (cm): 170.18 (2020 13:31)  Weight (kg): 76.2 (2020 13:31)  BMI (kg/m2): 26.3 (2020 13:31)  BSA (m2): 1.88 (2020 13:31)    CENTRAL LINE: [ ] YES [ ] NO  LOCATION:   DATE INSERTED:    GUZMAN: [ ] YES [ ] NO    DATE INSERTED:      PMH/Social Hx/Fam Hx -reviewed admission note, no change since admission  PAST MEDICAL & SURGICAL HISTORY:  Diabetes      T(C): 37 (20 @ 14:49), Max: 37 (20 @ 14:49)  HR: 115 (20 @ 14:49)  BP: 129/85 (20 @ 14:49)  BP(mean): --  ABP: --  ABP(mean): --  RR: 17 (20 @ 14:49)  SpO2: 99% (20 @ 14:49)  Wt(kg): --                PHYSICAL EXAM:    GENERAL: No signs of distress, comfortable  HEAD: Atraumatic, Normocephalic  EYES: EOMI, PERRLA  ENMT: No erythema, exudates, or enlargement, Moist mucous membranes  NECK: Supple, normal appearance, No JVD; [  ] central line (if applicable)  CHEST/LUNG: No chest deformity, fair bilateral air entry; No rales, rhonchi, wheezing  HEART: Regular rate and rhythm; No murmurs, rubs, or gallops;   ABDOMEN: Soft, Nontender, Nondistended; Bowel sounds present  EXTREMITIES:  + Peripheral Pulses, No clubbing, cyanosis, or edema  NERVOUS SYSTEM: awake and alert  LYMPH: No lymphadenopathy noted  SKIN: No rashes or lesions; good turgor, warm, dry      LABS:  CBC Full  -  ( 2020 13:56 )  WBC Count : 8.02 K/uL  RBC Count : 5.82 M/uL  Hemoglobin : 16.3 g/dL  Hematocrit : 49.3 %  Platelet Count - Automated : 229 K/uL  Mean Cell Volume : 84.7 fl  Mean Cell Hemoglobin : 28.0 pg  Mean Cell Hemoglobin Concentration : 33.1 gm/dL  Auto Neutrophil # : 5.53 K/uL  Auto Lymphocyte # : 1.94 K/uL  Auto Monocyte # : 0.43 K/uL  Auto Eosinophil # : 0.03 K/uL  Auto Basophil # : 0.06 K/uL  Auto Neutrophil % : 68.9 %  Auto Lymphocyte % : 24.2 %  Auto Monocyte % : 5.4 %  Auto Eosinophil % : 0.4 %  Auto Basophil % : 0.7 %        136  |  107  |  9   ----------------------------<  103<H>  4.4   |  27  |  0.95    Ca    8.2<L>      2020 22:04    TPro  8.2  /  Alb  3.8  /  TBili  0.8  /  DBili  x   /  AST  41<H>  /  ALT  54  /  AlkPhos  179<H>        Urinalysis Basic - ( 2020 13:56 )    Color: Yellow / Appearance: Clear / S.010 / pH: x  Gluc: x / Ketone: Moderate  / Bili: Negative / Urobili: Negative   Blood: x / Protein: Negative / Nitrite: Negative   Leuk Esterase: Negative / RBC: x / WBC x   Sq Epi: x / Non Sq Epi: x / Bacteria: x          RADIOLOGY & ADDITIONAL STUDIES REVIEWED         IMPRESSION:  PAST MEDICAL & SURGICAL HISTORY:  Diabetes   p/w           IMP: This is a 24 yr old man  M pt with  Type 1 DM, presents to the ED with complaints of vomiting x2 days, chest tightness and shin splints. ICU is consulted for hyperglycemia/ HONK.  Blood sugar improved with ivf and insulin. Pat stated he feels much better,      #Hyperglycemia  hx of T1DM since age 1  on insulin at home, compliance with insulin, last A1C 2 month ago was 8.2  , s/p regular insulin 10U, FS improved to 244  AG 9, Bicarb 25, pH 7.32, mental status at baseline, not in DKA vs HONK   s/p 2L NS bolus in ED   Repeat BMP noted AG still close, FS better controlled      Plan:       -continue insulin as per Endo  -   -continue care as per primary team    seen with girlfriend at bedside

## 2020-01-20 NOTE — DISCHARGE NOTE PROVIDER - NSCORESITESY/N_GEN_A_CORE_RD
Yes Follow up with Dr. Ambrocio in clinic, call office for appointment.  Copy of BMP lab results provided, please follow up with PCP regarding elevated calcium levels.   Okay to restart aspirin today. Can continue anagrilide. Start hydroxyurea on Monday, after 5 days.

## 2020-01-20 NOTE — PROGRESS NOTE ADULT - ATTENDING COMMENTS
Patient is seen and examined. Case reviewed with the medical team. Above note is appreciated. Will follow up clinically. Continue DVT prophylaxis. Case discussed with Endocrine and ICU attending. Spoke with patient in detail.

## 2020-01-20 NOTE — CONSULT NOTE ADULT - SUBJECTIVE AND OBJECTIVE BOX
Patient is a 24y old  Male who presents with a chief complaint of uncontrol dm /HONK (2020 18:42)      HPI:  Pt is a 24 yr old male with DM type 1 on Insulin at home comes in with vomiting, chest discomfort and leg pain. Pt was initially on insulin pump about 2 months ago but due to insurance problem and was transitioned to Novolog and basaglar Pt states that since then his bs has been very uncontrolled and he doesn't feel well. Pt states that he takes 38 units of basaglar and 25 TID of Novolog. Pt states compliance with medications. Pt denies any headache, weakness, palpitations, fever, chills, cough, shortness of breath, abd pain or change in urinary or bowel habits. (2020 18:05)  Pt takes basaglar 38 and novolog 25 tid. Admits to eating heavy carb diet. Last hypoglycemic episode was about 3 months ago    PAST MEDICAL & SURGICAL HISTORY:  Diabetes         MEDICATIONS  (STANDING):  dextrose 50% Injectable 25 Gram(s) IV Push once  insulin glargine Injectable (LANTUS) 15 Unit(s) SubCutaneous at bedtime  insulin glargine Injectable (LANTUS) 20 Unit(s) SubCutaneous once  insulin lispro (HumaLOG) corrective regimen sliding scale   SubCutaneous three times a day before meals  insulin lispro Injectable (HumaLOG) 6 Unit(s) SubCutaneous three times a day before meals    MEDICATIONS  (PRN):      FAMILY HISTORY:      SOCIAL HISTORY:      REVIEW OF SYSTEMS:  CONSTITUTIONAL: No fever, weight loss, or fatigue  EYES: No eye pain, visual disturbances, or discharge  ENT:  No difficulty hearing, tinnitus, vertigo; No sinus or throat pain  NECK: No pain or stiffness  RESPIRATORY: No cough, wheezing, chills or hemoptysis; No Shortness of Breath  CARDIOVASCULAR: No chest pain, palpitations, passing out, dizziness, or leg swelling  GASTROINTESTINAL: No abdominal or epigastric pain. No nausea, vomiting, or hematemesis; No diarrhea or constipation. No melena or hematochezia.  GENITOURINARY: No dysuria, frequency, hematuria, or incontinence  NEUROLOGICAL: No headaches, memory loss, loss of strength, numbness, or tremors  SKIN: No itching, burning, rashes, or lesions   LYMPH Nodes: No enlarged glands  ENDOCRINE: No heat or cold intolerance; No hair loss  MUSCULOSKELETAL: No joint pain or swelling; No muscle, back, or extremity pain  PSYCHIATRIC: No depression, anxiety, mood swings, or difficulty sleeping  HEME/LYMPH: No easy bruising, or bleeding gums  ALLERGY AND IMMUNOLOGIC: No hives or eczema	        Vital Signs Last 24 Hrs  T(C): 36.9 (2020 04:42), Max: 36.9 (2020 20:11)  T(F): 98.5 (2020 04:42), Max: 98.5 (2020 22:24)  HR: 93 (2020 04:42) (93 - 112)  BP: 126/75 (2020 04:42) (126/75 - 141/83)  BP(mean): --  RR: 16 (2020 04:42) (16 - 19)  SpO2: 100% (2020 04:42) (97% - 100%)      Constitutional:      HEENT: nad    Neck:  No JVD, bruits or thyromegaly    Respiratory:  Clear without rales or rhonchi    Cardiovascular:  RR without murmur, rub or gallop.    Gastrointestinal: Soft without hepatosplenomegaly.    Extremities: without cyanosis, clubbing or edema.    Neurological:  Oriented   x  3    . No gross sensory or motor defects.        LABS:                        16.3   8.02  )-----------( 229      ( 2020 13:56 )             49.3     -19    136  |  107  |  9   ----------------------------<  103<H>  4.4   |  27  |  0.95    Ca    8.2<L>      2020 22:04    TPro  8.2  /  Alb  3.8  /  TBili  0.8  /  DBili  x   /  AST  41<H>  /  ALT  54  /  AlkPhos  179<H>  -19    CARDIAC MARKERS ( 2020 16:35 )  <0.015 ng/mL / x     / 124 U/L / x     / 1.4 ng/mL        Urinalysis Basic - ( 2020 13:56 )    Color: Yellow / Appearance: Clear / S.010 / pH: x  Gluc: x / Ketone: Moderate  / Bili: Negative / Urobili: Negative   Blood: x / Protein: Negative / Nitrite: Negative   Leuk Esterase: Negative / RBC: x / WBC x   Sq Epi: x / Non Sq Epi: x / Bacteria: x      CAPILLARY BLOOD GLUCOSE      POCT Blood Glucose.: 328 mg/dL (2020 07:58)  POCT Blood Glucose.: 358 mg/dL (2020 05:57)  POCT Blood Glucose.: 208 mg/dL (2020 00:06)  POCT Blood Glucose.: 135 mg/dL (2020 22:20)  POCT Blood Glucose.: 92 mg/dL (2020 20:06)  POCT Blood Glucose.: 244 mg/dL (2020 17:10)  POCT Blood Glucose.: 368 mg/dL (2020 16:01)  POCT Blood Glucose.: 381 mg/dL (2020 15:11)  POCT Blood Glucose.: 432 mg/dL (2020 13:22)      RADIOLOGY & ADDITIONAL STUDIES:

## 2020-01-20 NOTE — DISCHARGE NOTE PROVIDER - CARE PROVIDER_API CALL
jaswant ritchie  [id number]  [facility] [address]  Phone: [phone]  Fax: (   )    -  Follow Up Time: 1-3 days

## 2020-01-20 NOTE — PROGRESS NOTE ADULT - SUBJECTIVE AND OBJECTIVE BOX
CHIEF COMPLAINT:Patient is a 24y old  Male who presents with a chief complaint of HONK (2020 11:41)    	  REVIEW OF SYSTEMS:  CONSTITUTIONAL: No fever, weight loss, or fatigue  EYES: No eye pain, visual disturbances, or discharge  ENMT:  No difficulty hearing, tinnitus, vertigo; No sinus or throat pain  NECK: No pain or stiffness  RESPIRATORY: No cough, wheezing, chills or hemoptysis; No Shortness of Breath  CARDIOVASCULAR: No chest pain, palpitations, passing out, dizziness, or leg swelling  GASTROINTESTINAL: No abdominal or epigastric pain. No nausea, vomiting, or hematemesis; No diarrhea or constipation. No melena or hematochezia.  GENITOURINARY: No dysuria, frequency, hematuria, or incontinence  NEUROLOGICAL: No headaches, memory loss, loss of strength, numbness, or tremors  SKIN: No itching, burning, rashes, or lesions   LYMPH Nodes: No enlarged glands  ENDOCRINE: No heat or cold intolerance; No hair loss  MUSCULOSKELETAL: No joint pain or swelling; No muscle, back, or extremity pain  PSYCHIATRIC: No depression, anxiety, mood swings, or difficulty sleeping  HEME/LYMPH: No easy bruising, or bleeding gums  ALLERY AND IMMUNOLOGIC: No hives or eczema	    [ ] All others negative	  [ ] Unable to obtain    PHYSICAL EXAM:  T(C): 37.3 (20 @ 20:36), Max: 37.3 (20 @ 20:36)  HR: 101 (20 @ 20:36) (93 - 115)  BP: 132/65 (20 @ 20:36) (126/75 - 132/65)  RR: 16 (20 @ 20:36) (16 - 17)  SpO2: 99% (20 @ 20:36) (99% - 100%)  Wt(kg): --  I&O's Summary      Appearance: Normal	  HEENT:   Normal oral mucosa, PERRL, EOMI	  Lymphatic: No lymphadenopathy  Cardiovascular: Normal S1 S2, No JVD, No murmurs, No edema  Respiratory: Lungs clear to auscultation	  Psychiatry: A & O x 3, Mood & affect appropriate  Gastrointestinal:  Soft, Non-tender, + BS	  Skin: No rashes, No ecchymoses, No cyanosis	  Neurologic: Non-focal  Extremities: Normal range of motion, No clubbing, cyanosis or edema  Vascular: Peripheral pulses palpable 2+ bilaterally    MEDICATIONS  (STANDING):  dextrose 50% Injectable 25 Gram(s) IV Push once  insulin glargine Injectable (LANTUS) 15 Unit(s) SubCutaneous at bedtime  insulin glargine Injectable (LANTUS) 20 Unit(s) SubCutaneous every morning  insulin lispro (HumaLOG) corrective regimen sliding scale   SubCutaneous three times a day before meals  insulin lispro Injectable (HumaLOG) 6 Unit(s) SubCutaneous three times a day before meals      TELEMETRY: 	    ECG:  	  RADIOLOGY:  OTHER: 	  	  CBC Full  -  ( 2020 13:56 )  WBC Count : 8.02 K/uL  Hemoglobin : 16.3 g/dL  Hematocrit : 49.3 %  Platelet Count - Automated : 229 K/uL  Mean Cell Volume : 84.7 fl  Mean Cell Hemoglobin : 28.0 pg  Mean Cell Hemoglobin Concentration : 33.1 gm/dL  Auto Neutrophil # : 5.53 K/uL  Auto Lymphocyte # : 1.94 K/uL  Auto Monocyte # : 0.43 K/uL  Auto Eosinophil # : 0.03 K/uL  Auto Basophil # : 0.06 K/uL  Auto Neutrophil % : 68.9 %  Auto Lymphocyte % : 24.2 %  Auto Monocyte % : 5.4 %  Auto Eosinophil % : 0.4 %  Auto Basophil % : 0.7 %        CARDIAC MARKERS:  CARDIAC MARKERS ( 2020 16:35 )  <0.015 ng/mL / x     / 124 U/L / x     / 1.4 ng/mL                              16.3   8.02  )-----------( 229      ( 2020 13:56 )             49.3           136  |  107  |  9   ----------------------------<  103<H>  4.4   |  27  |  0.95    Ca    8.2<L>      2020 22:04    TPro  8.2  /  Alb  3.8  /  TBili  0.8  /  DBili  x   /  AST  41<H>  /  ALT  54  /  AlkPhos  179<H>            Urinalysis Basic - ( 2020 13:56 )    Color: Yellow / Appearance: Clear / S.010 / pH: x  Gluc: x / Ketone: Moderate  / Bili: Negative / Urobili: Negative   Blood: x / Protein: Negative / Nitrite: Negative   Leuk Esterase: Negative / RBC: x / WBC x   Sq Epi: x / Non Sq Epi: x / Bacteria: x      proBNP:   Lipid Profile:   HgA1c:   TSH:

## 2020-01-20 NOTE — DISCHARGE NOTE PROVIDER - HOSPITAL COURSE
24 yr old male with DM type 1 on Insulin at home c/o vomiting, chest discomfort and leg pain. Pt was initially on insulin pump about 2 months ago but due to insurance problem  was transitioned to Novolog and basaglar.    Uncontrolled diabetes mellitus: 's with normal bicarb, anion gap, no signs of infection, fever or white count, CXr negative for infiltrate, UA negative. HA1C ?. Managed with Lantus, Lispro            TO BE COMPLETED 24 yr old male with DM type 1 on Insulin at home c/o vomiting, chest discomfort and leg pain. Pt was initially on insulin pump about 2 months ago but due to insurance problem  was transitioned to Novolog and basaglar.    Pt. admitted to medicine for management of Uncontrolled diabetes mellitus with 's with normal bicarb, anion gap, no signs of infection, fever or white count, CXr negative for infiltrate, UA negative. Pt. followed by endocrine Dr. Rosado with Insulin titrations per pt.'s requirements.  Pt. is medically stable for discharge to home to follow up with his endocrinologist.  Pt. wants Omnipod pump which needs to be requested by his endocrinologist.  He will be discharged with Basaglar and Humalog per dr. Rosado recommendations.    Pt.'s blood glucose levels better controlled now, he is medically stable for discharge to home.

## 2020-01-20 NOTE — DISCHARGE NOTE PROVIDER - NSDCMRMEDTOKEN_GEN_ALL_CORE_FT
Basaglar KwikPen 100 units/mL subcutaneous solution: 38 unit(s) subcutaneous once a day (at bedtime)  NovoLOG 100 units/mL subcutaneous solution: 25 unit(s) subcutaneous 3 times a day Basaglar KwikPen 100 units/mL subcutaneous solution: 35 unit(s) subcutaneous once a day (at bedtime)   HumaLOG KwikPen 100 units/mL injectable solution: 8 unit(s) injectable 3 times a day (before meals)  TAKE 8-10 UNITS DEPENDING ON MEAL

## 2020-01-21 ENCOUNTER — TRANSCRIPTION ENCOUNTER (OUTPATIENT)
Age: 25
End: 2020-01-21

## 2020-01-21 VITALS
DIASTOLIC BLOOD PRESSURE: 84 MMHG | HEART RATE: 78 BPM | OXYGEN SATURATION: 98 % | RESPIRATION RATE: 16 BRPM | TEMPERATURE: 99 F | SYSTOLIC BLOOD PRESSURE: 126 MMHG

## 2020-01-21 LAB
ANION GAP SERPL CALC-SCNC: 7 MMOL/L — SIGNIFICANT CHANGE UP (ref 5–17)
BUN SERPL-MCNC: 15 MG/DL — SIGNIFICANT CHANGE UP (ref 7–18)
CALCIUM SERPL-MCNC: 9.1 MG/DL — SIGNIFICANT CHANGE UP (ref 8.4–10.5)
CHLORIDE SERPL-SCNC: 103 MMOL/L — SIGNIFICANT CHANGE UP (ref 96–108)
CO2 SERPL-SCNC: 27 MMOL/L — SIGNIFICANT CHANGE UP (ref 22–31)
CREAT SERPL-MCNC: 0.96 MG/DL — SIGNIFICANT CHANGE UP (ref 0.5–1.3)
GLUCOSE BLDC GLUCOMTR-MCNC: 117 MG/DL — HIGH (ref 70–99)
GLUCOSE BLDC GLUCOMTR-MCNC: 171 MG/DL — HIGH (ref 70–99)
GLUCOSE SERPL-MCNC: 212 MG/DL — HIGH (ref 70–99)
HCT VFR BLD CALC: 45.5 % — SIGNIFICANT CHANGE UP (ref 39–50)
HGB BLD-MCNC: 15.3 G/DL — SIGNIFICANT CHANGE UP (ref 13–17)
MCHC RBC-ENTMCNC: 27.8 PG — SIGNIFICANT CHANGE UP (ref 27–34)
MCHC RBC-ENTMCNC: 33.6 GM/DL — SIGNIFICANT CHANGE UP (ref 32–36)
MCV RBC AUTO: 82.7 FL — SIGNIFICANT CHANGE UP (ref 80–100)
NRBC # BLD: 0 /100 WBCS — SIGNIFICANT CHANGE UP (ref 0–0)
PLATELET # BLD AUTO: 193 K/UL — SIGNIFICANT CHANGE UP (ref 150–400)
POTASSIUM SERPL-MCNC: 4.2 MMOL/L — SIGNIFICANT CHANGE UP (ref 3.5–5.3)
POTASSIUM SERPL-SCNC: 4.2 MMOL/L — SIGNIFICANT CHANGE UP (ref 3.5–5.3)
RBC # BLD: 5.5 M/UL — SIGNIFICANT CHANGE UP (ref 4.2–5.8)
RBC # FLD: 13.1 % — SIGNIFICANT CHANGE UP (ref 10.3–14.5)
SODIUM SERPL-SCNC: 137 MMOL/L — SIGNIFICANT CHANGE UP (ref 135–145)
WBC # BLD: 5.5 K/UL — SIGNIFICANT CHANGE UP (ref 3.8–10.5)
WBC # FLD AUTO: 5.5 K/UL — SIGNIFICANT CHANGE UP (ref 3.8–10.5)

## 2020-01-21 PROCEDURE — 93005 ELECTROCARDIOGRAM TRACING: CPT

## 2020-01-21 PROCEDURE — 85027 COMPLETE CBC AUTOMATED: CPT

## 2020-01-21 PROCEDURE — 82962 GLUCOSE BLOOD TEST: CPT

## 2020-01-21 PROCEDURE — 81003 URINALYSIS AUTO W/O SCOPE: CPT

## 2020-01-21 PROCEDURE — 84484 ASSAY OF TROPONIN QUANT: CPT

## 2020-01-21 PROCEDURE — 80053 COMPREHEN METABOLIC PANEL: CPT

## 2020-01-21 PROCEDURE — 80307 DRUG TEST PRSMV CHEM ANLYZR: CPT

## 2020-01-21 PROCEDURE — 36415 COLL VENOUS BLD VENIPUNCTURE: CPT

## 2020-01-21 PROCEDURE — 82550 ASSAY OF CK (CPK): CPT

## 2020-01-21 PROCEDURE — 82553 CREATINE MB FRACTION: CPT

## 2020-01-21 PROCEDURE — 80048 BASIC METABOLIC PNL TOTAL CA: CPT

## 2020-01-21 PROCEDURE — 99291 CRITICAL CARE FIRST HOUR: CPT

## 2020-01-21 PROCEDURE — 71045 X-RAY EXAM CHEST 1 VIEW: CPT

## 2020-01-21 PROCEDURE — 96372 THER/PROPH/DIAG INJ SC/IM: CPT

## 2020-01-21 PROCEDURE — 82803 BLOOD GASES ANY COMBINATION: CPT

## 2020-01-21 RX ORDER — INSULIN ASPART 100 [IU]/ML
25 INJECTION, SOLUTION SUBCUTANEOUS
Qty: 0 | Refills: 0 | DISCHARGE

## 2020-01-21 RX ORDER — INSULIN GLARGINE 100 [IU]/ML
35 INJECTION, SOLUTION SUBCUTANEOUS
Qty: 2 | Refills: 0
Start: 2020-01-21 | End: 2020-02-19

## 2020-01-21 RX ORDER — INSULIN LISPRO 100/ML
8 VIAL (ML) SUBCUTANEOUS
Qty: 2 | Refills: 0
Start: 2020-01-21 | End: 2020-02-19

## 2020-01-21 RX ORDER — INSULIN GLARGINE 100 [IU]/ML
38 INJECTION, SOLUTION SUBCUTANEOUS
Qty: 0 | Refills: 0 | DISCHARGE

## 2020-01-21 RX ADMIN — Medication 6 UNIT(S): at 12:20

## 2020-01-21 RX ADMIN — Medication 6 UNIT(S): at 08:26

## 2020-01-21 RX ADMIN — Medication 1: at 08:26

## 2020-01-21 RX ADMIN — INSULIN GLARGINE 20 UNIT(S): 100 INJECTION, SOLUTION SUBCUTANEOUS at 08:26

## 2020-01-21 NOTE — DISCHARGE NOTE NURSING/CASE MANAGEMENT/SOCIAL WORK - PATIENT PORTAL LINK FT
You can access the FollowMyHealth Patient Portal offered by [facility] by registering at the following website: [email or web address]. By joining [employer]’s FollowMyHealth portal, you will also be able to view your health information using other applications (apps) compatible with our system.

## 2020-01-21 NOTE — PROGRESS NOTE ADULT - SUBJECTIVE AND OBJECTIVE BOX
:::::ACUTE CARE FOLLOW UP:::::    INTERVAL HPI/OVERNIGHT EVENTS:       Antimicrobial:    Cardiovascular:    Pulmonary:    Hematalogic:    Other:  dextrose 50% Injectable 25 Gram(s) IV Push once  insulin glargine Injectable (LANTUS) 15 Unit(s) SubCutaneous at bedtime  insulin glargine Injectable (LANTUS) 20 Unit(s) SubCutaneous every morning  insulin lispro (HumaLOG) corrective regimen sliding scale   SubCutaneous three times a day before meals  insulin lispro Injectable (HumaLOG) 6 Unit(s) SubCutaneous three times a day before meals      Drug Dosing Weight  Height (cm): 170.18 (19 Jan 2020 13:31)  Weight (kg): 76.2 (19 Jan 2020 13:31)  BMI (kg/m2): 26.3 (19 Jan 2020 13:31)  BSA (m2): 1.88 (19 Jan 2020 13:31)    CENTRAL LINE: [ ] YES [ ] NO  LOCATION:   DATE INSERTED:    GUZMAN: [ ] YES [ ] NO    DATE INSERTED:      PMH/Social Hx/Fam Hx -reviewed admission note, no change since admission  PAST MEDICAL & SURGICAL HISTORY:  Diabetes      T(C): 37.2 (01-21-20 @ 14:30), Max: 37.3 (01-20-20 @ 20:36)  HR: 78 (01-21-20 @ 14:30)  BP: 126/84 (01-21-20 @ 14:30)  BP(mean): --  ABP: --  ABP(mean): --  RR: 16 (01-21-20 @ 14:30)  SpO2: 98% (01-21-20 @ 14:30)  Wt(kg): --                PHYSICAL EXAM:    GENERAL: No signs of distress, comfortable  HEAD: Atraumatic, Normocephalic  EYES: EOMI, PERRLA  ENMT: No erythema, exudates, or enlargement, Moist mucous membranes  NECK: Supple, normal appearance, No JVD; [  ] central line (if applicable)  CHEST/LUNG: No chest deformity, fair bilateral air entry; No rales, rhonchi, wheezing  HEART: Regular rate and rhythm; No murmurs, rubs, or gallops;   ABDOMEN: Soft, Nontender, Nondistended; Bowel sounds present  EXTREMITIES:  + Peripheral Pulses, No clubbing, cyanosis, or edema  NERVOUS SYSTEM: awake and alert  LYMPH: No lymphadenopathy noted  SKIN: No rashes or lesions; good turgor, warm, dry      LABS:  CBC Full  -  ( 21 Jan 2020 06:03 )  WBC Count : 5.50 K/uL  RBC Count : 5.50 M/uL  Hemoglobin : 15.3 g/dL  Hematocrit : 45.5 %  Platelet Count - Automated : 193 K/uL  Mean Cell Volume : 82.7 fl  Mean Cell Hemoglobin : 27.8 pg  Mean Cell Hemoglobin Concentration : 33.6 gm/dL  Auto Neutrophil # : x  Auto Lymphocyte # : x  Auto Monocyte # : x  Auto Eosinophil # : x  Auto Basophil # : x  Auto Neutrophil % : x  Auto Lymphocyte % : x  Auto Monocyte % : x  Auto Eosinophil % : x  Auto Basophil % : x    01-21    137  |  103  |  15  ----------------------------<  212<H>  4.2   |  27  |  0.96    Ca    9.1      21 Jan 2020 06:03              RADIOLOGY & ADDITIONAL STUDIES REVIEWED         IMPRESSION:  PAST MEDICAL & SURGICAL HISTORY:  Diabetes   p/w       IMP: This is a 24 yr old man  M pt with  Type 1 DM, presents to the ED with complaints of vomiting x2 days, chest tightness and shin splints. ICU is consulted for hyperglycemia/ HONK.  Blood sugar improved with ivf and insulin. Pat stated he feels much better,      #Hyperglycemia  hx of T1DM since age 1  on insulin at home, compliance with insulin, last A1C 2 month ago was 8.2  , s/p regular insulin 10U, FS improved to 244  AG 9, Bicarb 25, pH 7.32, mental status at baseline, not in DKA vs HONK   s/p 2L NS bolus in ED   Repeat BMP noted AG still close, FS better controlled      Plan:       -continue insulin as per Endo  -   -continue care as per primary team

## 2022-05-07 NOTE — PATIENT PROFILE ADULT - DO YOU FEEL LIKE HURTING YOURSELF OR OTHERS?
PT IE completed. Patient received standing EOB +heplock IV, NAD, +wife present at bedside, patient willing to work with PT no

## 2023-07-31 NOTE — PATIENT PROFILE ADULT - NSPROMEDSADMININFO_GEN_A_NUR
no concerns High Dose Vitamin A Counseling: Side effects reviewed, pt to contact office should one occur.

## 2024-06-27 NOTE — ED ADULT NURSE NOTE - GASTROINTESTINAL WDL
Problem: Discharge Planning  Goal: Discharge to home or other facility with appropriate resources  6/27/2024 1452 by Shelley Verdin RN  Outcome: Progressing  6/27/2024 0644 by Bharti Ruiz RN  Outcome: Progressing     Problem: Pain  Goal: Verbalizes/displays adequate comfort level or baseline comfort level  6/27/2024 1452 by Shelley Verdin RN  Outcome: Progressing  6/27/2024 0644 by Bharti Ruiz RN  Outcome: Progressing     Problem: Safety - Adult  Goal: Free from fall injury  6/27/2024 1452 by Shelley Verdin RN  Outcome: Progressing  6/27/2024 0644 by Bharti Ruiz RN  Outcome: Progressing     Problem: ABCDS Injury Assessment  Goal: Absence of physical injury  6/27/2024 1452 by Shelley Verdin RN  Outcome: Progressing  6/27/2024 0644 by Bharti Ruiz RN  Outcome: Progressing     
  Problem: Discharge Planning  Goal: Discharge to home or other facility with appropriate resources  Outcome: Progressing     Problem: Pain  Goal: Verbalizes/displays adequate comfort level or baseline comfort level  Outcome: Progressing     Problem: Safety - Adult  Goal: Free from fall injury  Outcome: Progressing     Problem: ABCDS Injury Assessment  Goal: Absence of physical injury  Outcome: Progressing     
  Problem: Discharge Planning  Goal: Discharge to home or other facility with appropriate resources  Outcome: Progressing     Problem: Pain  Goal: Verbalizes/displays adequate comfort level or baseline comfort level  Outcome: Progressing     Problem: Safety - Adult  Goal: Free from fall injury  Outcome: Progressing     Problem: ABCDS Injury Assessment  Goal: Absence of physical injury  Outcome: Progressing     
Abdomen soft, nontender, nondistended, bowel sounds present in all 4 quadrants.